# Patient Record
Sex: FEMALE | Race: BLACK OR AFRICAN AMERICAN | NOT HISPANIC OR LATINO | Employment: FULL TIME | URBAN - METROPOLITAN AREA
[De-identification: names, ages, dates, MRNs, and addresses within clinical notes are randomized per-mention and may not be internally consistent; named-entity substitution may affect disease eponyms.]

---

## 2023-08-09 ENCOUNTER — OFFICE VISIT (OUTPATIENT)
Dept: URGENT CARE | Facility: CLINIC | Age: 53
End: 2023-08-09
Payer: COMMERCIAL

## 2023-08-09 VITALS
OXYGEN SATURATION: 97 % | HEIGHT: 66 IN | HEART RATE: 98 BPM | WEIGHT: 250 LBS | BODY MASS INDEX: 40.18 KG/M2 | RESPIRATION RATE: 18 BRPM | TEMPERATURE: 100.7 F | DIASTOLIC BLOOD PRESSURE: 88 MMHG | SYSTOLIC BLOOD PRESSURE: 132 MMHG

## 2023-08-09 DIAGNOSIS — R50.9 FEVER, UNSPECIFIED FEVER CAUSE: ICD-10-CM

## 2023-08-09 DIAGNOSIS — J32.9 RHINOSINUSITIS: ICD-10-CM

## 2023-08-09 DIAGNOSIS — U07.1 COVID-19: ICD-10-CM

## 2023-08-09 LAB
FLUAV RNA SPEC QL NAA+PROBE: NEGATIVE
FLUBV RNA SPEC QL NAA+PROBE: NEGATIVE
RSV RNA SPEC QL NAA+PROBE: NEGATIVE
SARS-COV-2 RNA RESP QL NAA+PROBE: POSITIVE

## 2023-08-09 PROCEDURE — 99203 OFFICE O/P NEW LOW 30 MIN: CPT | Performed by: PHYSICIAN ASSISTANT

## 2023-08-09 PROCEDURE — 0241U POCT CEPHEID COV-2, FLU A/B, RSV - PCR: CPT | Performed by: PHYSICIAN ASSISTANT

## 2023-08-09 PROCEDURE — 3079F DIAST BP 80-89 MM HG: CPT | Performed by: PHYSICIAN ASSISTANT

## 2023-08-09 PROCEDURE — 3075F SYST BP GE 130 - 139MM HG: CPT | Performed by: PHYSICIAN ASSISTANT

## 2023-08-09 RX ORDER — PREDNISONE 10 MG/1
TABLET ORAL
COMMUNITY
Start: 2023-05-15

## 2023-08-09 RX ORDER — LISINOPRIL AND HYDROCHLOROTHIAZIDE 12.5; 1 MG/1; MG/1
TABLET ORAL
COMMUNITY
Start: 2023-07-07

## 2023-08-09 ASSESSMENT — ENCOUNTER SYMPTOMS: COUGH: 1

## 2023-08-09 NOTE — PROGRESS NOTES
"Subjective:   Lyudmila Brown is a 53 y.o. female who presents for Cough (X4 days, headache, runny nose and dizziness )     This is a very pleasant 53-year-old female who presents with chief complaint of nasal congestion, right ear pain and congestion,  right-sided sinus pressure with pain behind the eye and teeth.  She does feel associated postnasal drainage and need for throat clearing.  She denies chest congestion.  She has felt febrile with temp today 100.7.  She has required prednisone and inhalers in the past.  She currently denies shortness of breath or cough.  She presents today with her daughter whom she is here helping move.  She reports her symptoms started after drinking, her daughters water bottle.  Her daughter has similar symptoms.  She has no history of pneumonia.  She does not use tobacco.  She has a history of bronchitis intermittently.          Review of Systems   Respiratory:  Positive for cough.        Medications:  lisinopril-hydrochlorothiazide  predniSONE Tabs    Allergies:             Patient has no known allergies.    Surgical History:       No past surgical history on file.    Past Social Hx:  Lyudmila Brown  reports that she has never smoked. She has never used smokeless tobacco. She reports current alcohol use. She reports that she does not use drugs.     Past Family Hx:   Lyudmila Brown family history is not on file.       Problem list, medications, and allergies reviewed by myself today in Epic.     Objective:     /88   Pulse 98   Temp (!) 38.2 °C (100.7 °F) (Temporal)   Resp 18   Ht 1.676 m (5' 6\")   Wt 113 kg (250 lb)   SpO2 97%   BMI 40.35 kg/m²     Physical Exam  Vitals and nursing note reviewed.   Constitutional:       General: She is not in acute distress.     Appearance: Normal appearance. She is well-developed. She is not ill-appearing or toxic-appearing.   HENT:      Head: Normocephalic.      Right Ear: External ear normal. No tenderness. A middle ear effusion is " present. No mastoid tenderness. Tympanic membrane is injected. Tympanic membrane is not perforated or bulging. Tympanic membrane has decreased mobility.      Left Ear: External ear normal. No tenderness. A middle ear effusion is present. No mastoid tenderness. Tympanic membrane is injected. Tympanic membrane is not perforated or bulging. Tympanic membrane has decreased mobility.      Nose: Mucosal edema, congestion and rhinorrhea present.      Right Nostril: No foreign body.      Left Nostril: No foreign body.      Right Turbinates: Swollen.      Left Turbinates: Swollen.      Right Sinus: Maxillary sinus tenderness and frontal sinus tenderness present.      Left Sinus: Maxillary sinus tenderness and frontal sinus tenderness present.      Mouth/Throat:      Mouth: Mucous membranes are moist.      Pharynx: Uvula midline. Posterior oropharyngeal erythema present. No pharyngeal swelling, oropharyngeal exudate or uvula swelling.      Tonsils: No tonsillar exudate or tonsillar abscesses.      Comments: Mild pharyngeal edema.  No tonsillar exudate.  Eyes:      Extraocular Movements: Extraocular movements intact.      Pupils: Pupils are equal, round, and reactive to light.   Cardiovascular:      Rate and Rhythm: Normal rate and regular rhythm.      Pulses: Normal pulses.      Heart sounds: Normal heart sounds. No murmur heard.  Pulmonary:      Effort: Pulmonary effort is normal. No tachypnea or respiratory distress.      Breath sounds: Normal breath sounds and air entry. No stridor or decreased air movement. No decreased breath sounds, wheezing, rhonchi or rales.      Comments: Lungs are clear to auscultation bilaterally, no rhonchi rales or wheezes  Chest:      Chest wall: No tenderness.   Musculoskeletal:      Cervical back: Normal range of motion. No rigidity.   Lymphadenopathy:      Cervical: No cervical adenopathy.   Neurological:      Mental Status: She is alert.   Psychiatric:         Behavior: Behavior is  cooperative.       Results for orders placed or performed in visit on 08/09/23   POCT CoV-2, Flu A/B, RSV by PCR   Result Value Ref Range    SARS-CoV-2 by PCR Positive (A) Negative, Invalid    Influenza virus A RNA Negative Negative, Invalid    Influenza virus B, PCR Negative Negative, Invalid    RSV, PCR Negative Negative, Invalid        Assessment/Plan:     Diagnosis and Associated Orders:     1. Fever, unspecified fever cause  - POCT CoV-2, Flu A/B, RSV by PCR  - Nirmatrelvir&Ritonavir 300/100 20 x 150 MG & 10 x 100MG Tablet Therapy Pack; Take 300 mg nirmatrelvir (two 150 mg tablets) with 100 mg ritonavir (one 100 mg tablet) by mouth, with all three tablets taken together twice daily for 5 days.  Dispense: 30 Each; Refill: 0    2. Rhinosinusitis  - Nirmatrelvir&Ritonavir 300/100 20 x 150 MG & 10 x 100MG Tablet Therapy Pack; Take 300 mg nirmatrelvir (two 150 mg tablets) with 100 mg ritonavir (one 100 mg tablet) by mouth, with all three tablets taken together twice daily for 5 days.  Dispense: 30 Each; Refill: 0    3. COVID-19    Other orders  - lisinopril-hydrochlorothiazide (PRINZIDE) 10-12.5 MG per tablet  - predniSONE (DELTASONE) 10 MG Tab; TAKE 4 TABLETS BY MOUTH DAILY FOR 3 DAYS, THEN 3 TABS DAILY FOR 3 DAYS, THEN 2 TABS DAILY FOR 3 DAYS, THEN 1 TAB DAILY FOR 3 DAYS. TAKE ONCE (Patient not taking: Reported on 8/9/2023)        Comments/MDM:  Fever manifest as systemic illness.  Rapid Covid testing in clinic was positive. At this point the patient appears to be hemodynamically stable without evidence of hypoxia or endorgan injury. I discussed with her the possibility of decompensation and to monitor symptoms closely. Consider pulse oximetry and ER presentation if oximetry dips below 90%.  We discussed self isolation and ER precautions.  Patient should to proceed to ED for development of symptoms including but not limited to shortness of breath breath, respiratory distress, or worsening symptoms not manageable  at home.  I instructed the patient to try to follow up with their PCP (if applicable) for follow up care.  If requested, I provided the patient with a work note to provide to their employer or school regarding returning to work and discontinuation of self isolation.  Symptomatic and supportive care:   Plenty of oral hydration and rest   Over the counter cough suppressant as directed.  Tylenol or ibuprofen for pain and fever as directed.   Warm salt water gargles for sore throat, soft foods, cool liquids.   Saline nasal spray and Flonase as a decongestant.   Infection control measures at home. Stay away from people, Hand washing, covering sneeze/cough, disinfect surfaces.   Remain home from work, school, and other populated environments.  Follow CDC guidelines regarding quarantine.    Positive for Covid 19. Pt has good understanding of etiology and disease course. Quarantine [place] CDC guidelines- guidelines reviewed.    Pt qualifies for treatment with Paxlovid. Paxlovid risks, benefits, side effects reviewed. Medications reviewed for potential interactions. Renal dose adjustment not indicated.    Discussed use Paxlovid antiviral treatment's potential to reduce risk of hospitalization and mortality. Side effects are diarrhea, hypertension, and muscle aches, altered taste discussed. Patient agreeable to this. Advised patient to continue with current regimen OTC antihistamine oral medication, Flonase nasal spray, Nasal flushes, and chloraseptic spray.   Recommend supportive measures including humidifier, warm salt water gargles, over-the-counter Cepacol throat lozenges, rest  and increased fluids. Advised to continue with supportive measures. Recommend to take Mucinex as needed during the day and use throat lozenges such as Ricola.  Use dextromethorphan for cough only at night to allow the body to expel the pathogen during the day.  Take 5-10 deep breaths intermittently throughout the day and move the body as tolerated to  aid in respiration.     Pt was encouraged to seek treatment in the ER or urgent care for worsening symptoms, fever greater than 100.5, wheezes, shortness of breath, dyspnea, or syncope.Recommend reevaluation with any new or worsening symptoms.      All questions were answered and patient demonstrated verbal understanding of above.    I personally reviewed prior external notes and test results pertinent to today's visit. Supportive care, natural history, differential diagnoses, and indications for immediate follow-up discussed. Return to clinic or go to ED if symptoms worsen or persist.  Red flag symptoms discussed.  Patient/Parent/Guardian voices understanding. Follow-up with your primary care provider in 3-5 days.  All side effects of medication discussed including allergic response, GI upset, tendon injury, rash, sedation etc    Please note that this dictation was created using voice recognition software. I have made a reasonable attempt to correct obvious errors, but I expect that there are errors of grammar and possibly content that I did not discover before finalizing the note.    This note was electronically signed by Mita Castillo PA-C